# Patient Record
(demographics unavailable — no encounter records)

---

## 2024-11-22 NOTE — ASSESSMENT
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 - Discussed at length with the patient that next course of action would depend on results  # No HE # No EV on EGD by Dr. Wheeler 2023  # Ascites.8/23/24 Pt was adm to [facility] ED for ascites. Currently furosemide 40 mg 1T & spironolactone 50 mg 2T (total 100mg). has been controlled well. No LE edema - Low sodium diet <2g per day - Current Med: furosemide 20 mg 2T & spironolactone 50 mg 2T. - Med modification: decreased to furosemide 20 mg 1T & spironolactone 50 mg 1T   # Liver lesion likely benign AFP wnl - Will check MRI in 6 months to r/o HCC  # ANDREW Hb 12.8 Iron Sat 15% Pt was started Iron suppl for 3 months by PCP.  # Health Maintenance - Immune to Hep A - Non-Immune to Hep B (HBsAg- HBcAb- HBsAb+8.8) If pt is immunocompromised or high-risk for hep B, will give a booster shot series (double the dose vaccine or use Heplisav-B). - advised Pna vaccine, RSV vaccine.  Labs 5/2025 MRI abd to r/o HCC 5/2025 (if stable, will alternate US abd and MRI) RTO 5/2025

## 2024-11-22 NOTE — ASSESSMENT
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 - Discussed at length with the patient that next course of action would depend on results  # No HE # No EV on EGD by Dr. Wheeler 2023  # Ascites.8/23/24 Pt was adm to [facility] ED for ascites. Currently furosemide 40 mg 1T & spironolactone 50 mg 2T (total 100mg). has been controlled well. No LE edema - Low sodium diet <2g per day - Current Med: furosemide 20 mg 2T & spironolactone 50 mg 2T. - Med modification: decreased to furosemide 20 mg 1T & spironolactone 50 mg 1T   # Liver lesion likely benign AFP wnl - Will check MRI in 6 months to r/o HCC  # ANDREW Hb 12.8 Iron Sat 15% Pt was started Iron suppl for 3 months by PCP.  # Health Maintenance - Immune to Hep A - Non-Immune to Hep B (HBsAg- HBcAb- HBsAb+8.8) If pt is immunocompromised or high-risk for hep B, will give a booster shot series (double the dose vaccine or use Heplisav-B). - advised Pna vaccine, RSV vaccine.  Labs 5/2025 MRI abd to r/o HCC 5/2025 (if stable, will alternate US abd and MRI) RTO 5/2025

## 2024-11-22 NOTE — PHYSICAL EXAM
[Scleral Icterus] : No Scleral Icterus [Spider Angioma] : Spider angioma(s) was(were) observed [# of Spider Angiomas: ___] : total number [unfilled] [Abdominal  Ascites] : no ascites [Ascites Fluid Wave] : no ascites fluid wave [Ascites Tense] : ascites is not tense [Liver Palpable ___ Finger Breadths Below Costal Margin] : Liver edge was palpable [unfilled] finger breadths below costal margin [Non-Tender] : non-tender [Asterixis] : no asterixis observed [Jaundice] : No jaundice [Palmar Erythema] : Palmar Erythema [General Appearance - Alert] : alert [General Appearance - In No Acute Distress] : in no acute distress [General Appearance - Well Developed] : well developed [Sclera] : the sclera and conjunctiva were normal [Outer Ear] : the ears and nose were normal in appearance [Neck Appearance] : the appearance of the neck was normal [Exaggerated Use Of Accessory Muscles For Inspiration] : no accessory muscle use [Heart Sounds] : normal S1 and S2 [Murmurs] : no murmurs [Abnormal Walk] : normal gait [Skin Color & Pigmentation] : normal skin color and pigmentation [] : no rash [Oriented To Time, Place, And Person] : oriented to person, place, and time [Impaired Insight] : insight and judgment were intact [Affect] : the affect was normal

## 2024-11-22 NOTE — REVIEW OF SYSTEMS
[Fever] : no fever [Chills] : no chills [Eye Pain] : no eye pain [Red Eyes] : eyes not red [Earache] : no earache [Chest Pain] : no chest pain [Palpitations] : no palpitations [Shortness Of Breath] : no shortness of breath [Cough] : no cough [Abdominal Pain] : no abdominal pain [Vomiting] : no vomiting [Constipation] : no constipation [Diarrhea] : no diarrhea [Heartburn] : no heartburn [Melena] : no melena [Limb Swelling] : no limb swelling [Itching] : no itching [Confused] : no confusion [Dizziness] : no dizziness [Fainting] : no fainting [Anxiety] : no anxiety [Easy Bleeding] : no tendency for easy bleeding [Easy Bruising] : no tendency for easy bruising

## 2024-11-22 NOTE — HISTORY OF PRESENT ILLNESS
[FreeTextEntry1] : Referring from GI Dr. 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 Here for the 1st follow-up.  1. No HE 2. No EV on EGD by Dr. Wheeler 2023 3. Ascites. 8/23/24 Pt was adm to [facility] ED for ascites. Currently furosemide 20 mg 2T & spironolactone 50 mg 2T. has been controlled well. No LE edema  Today he reports feeling well and no physical complaints.  [Medical Hx] as above [Surgical Hx] cholecystectomy [Social Hx] Roommate with 4 friends Alcohol: history of drinking 1 bottle of Soju every other day for 20 years until the age of 49. Tobacco: Cig since his teens. Currently e-cig every day. illicit drug: Denies Herb and dietary Supplement: Vit D [FMH of liver disease] None  [Other meds] Lantus 100 units/mL subcutaneous solution: 25 unit(s) subcutaneous once a day levothyroxine 50 mcg (0.05 mg) oral tablet: 1 tab(s) orally once a day losartan 100 mg oral tablet: 1 tab(s) orally once a day  [Labs] 9/17/24 wbc 7.05 Hb 12.8 .1  Iron Sat 15% HCV RNA PCR NR Glucose 163 AST/ALT 28/19 ALP//234  [Imaging] MRI w/wo EOVIST 10/31/24 1cm hemangioma medial seg left lobe of left liver seg IVb, unchanged. some of the previously seen subcapsular enhancing arterial phase foci are not identified. The largest arterial phase enhancing focus on the current stufy- 9mm in seg 2 (likely benign perfusion anomalies.) cirrhosis. No suspicious hepatic mass. Atretic pancreas and dilated pancreatic duct likely sequela of prior pancreatitis. Normal spleen.  CT abd 8/22/24: LOWER CHEST: Coronary artery calcification. Mild dependent atelectasis. Few scattered noncalcified pulmonary nodules measuring up to 4 mm for example within the right middle lobe Subacute minimally displaced healing fractures of the posterior aspect of left 10th through 12th ribs with callus formation to variable degree and persistent fracture lucencies. LIVER: Cirrhosis. Heterogeneous hepatic enhancement. Approximately 1.5 cm enhancing lesion comparable to vasculature located adjacent to the right portal vein anterior division also in close proximity to the right hepatic artery branch vessel suspicious for a pseudoaneurysm BILE DUCTS: Mild intrahepatic biliary dilation with small volume pneumobilia. Extrahepatic biliary dilation for example, common hepatic duct appears to measure 1.7 cm and proximal common bile duct may measure 1.7 cm. Distal common bile duct appears to taper GALLBLADDER: Cholecystectomy. Suggestion of a low insertion of the cystic duct remnant SPLEEN: Within normal limits. PANCREAS: Diffusely atrophic with innumerable scattered parenchymal calcifications and mild main duct dilation for example measuring up to 6 mm at the pancreatic neck likely represents sequela of chronic pancreatitis ADRENALS: Within normal limits. KIDNEYS/URETERS: Symmetric renal enhancement without hydronephrosis. BLADDER: Within normal limits. REPRODUCTIVE ORGANS: Prostate is mildly enlarged. BOWEL: Lower esophageal wall thickening. Gastric wall thickening. Mild air distention of mid small bowel loops may correspond with ileus No bowel obstruction. Appendix is normal. Edematous wall thickening of the cecum and ascending colon to hepatic flexure PERITONEUM/RETROPERITONEUM: Moderate volume abdominal and pelvic ascites. No free air VESSELS: Atherosclerotic changes. Small caliber lower esophageal, gastrohepatic and gastric fundal varices. LYMPH NODES: No lymphadenopathy. ABDOMINAL WALL: No significant acute abnormality. BONES: Multilevel degenerative changes throughout the spine. IMPRESSION: Subacute minimally displaced healing fractures of the posterior aspect of left 10th through 12th ribs as described Cirrhosis. Approximately 1.5 cm enhancing lesion within the right hepatic lobe as described is suspicious for a pseudoaneurysm. Consider interventional radiology consultation and liver doppler ultrasound Sequela of portal venous hypertension as described notably with moderate volume abdominal and pelvic ascites  US DPLX ABDOMEN 08/22/2024 1.4 cm complex cystic liver lesion, corresponds to lesion described on CT. Findings suggest venous malformation based on intense enhancement on CT scan. No evidence of pseudoaneurysm or hepatic artery aneurysm. Cirrhotic morphology with moderate ascites. Patent portal vein with hepatopetal flow and dilated CBD. Pneumobilia. Mild intrahepatic and moderate extrahepatic biliary dilation Lower esophageal and gastric wall thickening suggests esophagitis and gastritis versus portal hypertensive changes Edematous wall thickening of the cecum through the hepatic flexure may indicate colitis versus portal hypertensive colopathy US abd 12/21/2020: 1.2cm hypoechoic lesion in right lobe. pneumobilia. s/p cholecystectomy.  [Procedures] EGD by Dr. Wheeler in 2023: No EV per pt 10/2/2020: mod erosive gastritis Colonoscopy 2018: Negative

## 2024-11-22 NOTE — PHYSICAL EXAM
[Scleral Icterus] : No Scleral Icterus [Spider Angioma] : Spider angioma(s) was(were) observed [# of Spider Angiomas: ___] : total number [unfilled] [Abdominal  Ascites] : no ascites [Ascites Fluid Wave] : no ascites fluid wave [Ascites Tense] : ascites is not tense [Liver Palpable ___ Finger Breadths Below Costal Margin] : Liver edge was palpable [unfilled] finger breadths below costal margin [Non-Tender] : non-tender [Asterixis] : no asterixis observed [Jaundice] : No jaundice [Palmar Erythema] : Palmar Erythema [General Appearance - Alert] : alert [General Appearance - In No Acute Distress] : in no acute distress [General Appearance - Well Developed] : well developed [Sclera] : the sclera and conjunctiva were normal [Outer Ear] : the ears and nose were normal in appearance [Neck Appearance] : the appearance of the neck was normal [Exaggerated Use Of Accessory Muscles For Inspiration] : no accessory muscle use [Heart Sounds] : normal S1 and S2 [Murmurs] : no murmurs [Abnormal Walk] : normal gait [] : no rash [Skin Color & Pigmentation] : normal skin color and pigmentation [Oriented To Time, Place, And Person] : oriented to person, place, and time [Impaired Insight] : insight and judgment were intact [Affect] : the affect was normal

## 2024-11-22 NOTE — HISTORY OF PRESENT ILLNESS
[FreeTextEntry1] : Referring from GI Dr. 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 Here for the 1st follow-up.  1. No HE 2. No EV on EGD by Dr. Wheeler 2023 3. Ascites. 8/23/24 Pt was adm to [facility] ED for ascites. Currently furosemide 20 mg 2T & spironolactone 50 mg 2T. has been controlled well. No LE edema  Today he reports feeling well and no physical complaints.  [Medical Hx] as above [Surgical Hx] cholecystectomy [Social Hx] Roommate with 4 friends Alcohol: history of drinking 1 bottle of Soju every other day for 20 years until the age of 49. Tobacco: Cig since his teens. Currently e-cig every day. illicit drug: Denies Herb and dietary Supplement: Vit D [FMH of liver disease] None  [Other meds] Lantus 100 units/mL subcutaneous solution: 25 unit(s) subcutaneous once a day levothyroxine 50 mcg (0.05 mg) oral tablet: 1 tab(s) orally once a day losartan 100 mg oral tablet: 1 tab(s) orally once a day  [Labs] 9/17/24 wbc 7.05 Hb 12.8 .1  Iron Sat 15% HCV RNA PCR NR Glucose 163 AST/ALT 28/19 ALP//234  [Imaging] MRI w/wo EOVIST 10/31/24 1cm hemangioma medial seg left lobe of left liver seg IVb, unchanged. some of the previously seen subcapsular enhancing arterial phase foci are not identified. The largest arterial phase enhancing focus on the current stufy- 9mm in seg 2 (likely benign perfusion anomalies.) cirrhosis. No suspicious hepatic mass. Atretic pancreas and dilated pancreatic duct likely sequela of prior pancreatitis. Normal spleen.  CT abd 8/22/24: LOWER CHEST: Coronary artery calcification. Mild dependent atelectasis. Few scattered noncalcified pulmonary nodules measuring up to 4 mm for example within the right middle lobe Subacute minimally displaced healing fractures of the posterior aspect of left 10th through 12th ribs with callus formation to variable degree and persistent fracture lucencies. LIVER: Cirrhosis. Heterogeneous hepatic enhancement. Approximately 1.5 cm enhancing lesion comparable to vasculature located adjacent to the right portal vein anterior division also in close proximity to the right hepatic artery branch vessel suspicious for a pseudoaneurysm BILE DUCTS: Mild intrahepatic biliary dilation with small volume pneumobilia. Extrahepatic biliary dilation for example, common hepatic duct appears to measure 1.7 cm and proximal common bile duct may measure 1.7 cm. Distal common bile duct appears to taper GALLBLADDER: Cholecystectomy. Suggestion of a low insertion of the cystic duct remnant SPLEEN: Within normal limits. PANCREAS: Diffusely atrophic with innumerable scattered parenchymal calcifications and mild main duct dilation for example measuring up to 6 mm at the pancreatic neck likely represents sequela of chronic pancreatitis ADRENALS: Within normal limits. KIDNEYS/URETERS: Symmetric renal enhancement without hydronephrosis. BLADDER: Within normal limits. REPRODUCTIVE ORGANS: Prostate is mildly enlarged. BOWEL: Lower esophageal wall thickening. Gastric wall thickening. Mild air distention of mid small bowel loops may correspond with ileus No bowel obstruction. Appendix is normal. Edematous wall thickening of the cecum and ascending colon to hepatic flexure PERITONEUM/RETROPERITONEUM: Moderate volume abdominal and pelvic ascites. No free air VESSELS: Atherosclerotic changes. Small caliber lower esophageal, gastrohepatic and gastric fundal varices. LYMPH NODES: No lymphadenopathy. ABDOMINAL WALL: No significant acute abnormality. BONES: Multilevel degenerative changes throughout the spine. IMPRESSION: Subacute minimally displaced healing fractures of the posterior aspect of left 10th through 12th ribs as described Cirrhosis. Approximately 1.5 cm enhancing lesion within the right hepatic lobe as described is suspicious for a pseudoaneurysm. Consider interventional radiology consultation and liver doppler ultrasound Sequela of portal venous hypertension as described notably with moderate volume abdominal and pelvic ascites  US DPLX ABDOMEN 08/22/2024 1.4 cm complex cystic liver lesion, corresponds to lesion described on CT. Findings suggest venous malformation based on intense enhancement on CT scan. No evidence of pseudoaneurysm or hepatic artery aneurysm. Cirrhotic morphology with moderate ascites. Patent portal vein with hepatopetal flow and dilated CBD. Pneumobilia. Mild intrahepatic and moderate extrahepatic biliary dilation Lower esophageal and gastric wall thickening suggests esophagitis and gastritis versus portal hypertensive changes Edematous wall thickening of the cecum through the hepatic flexure may indicate colitis versus portal hypertensive colopathy US abd 12/21/2020: 1.2cm hypoechoic lesion in right lobe. pneumobilia. s/p cholecystectomy.  [Procedures] EGD by Dr. Wheeler in 2023: No EV per pt 10/2/2020: mod erosive gastritis Colonoscopy 2018: Negative